# Patient Record
Sex: FEMALE | Race: WHITE
[De-identification: names, ages, dates, MRNs, and addresses within clinical notes are randomized per-mention and may not be internally consistent; named-entity substitution may affect disease eponyms.]

---

## 2017-09-20 ENCOUNTER — HOSPITAL ENCOUNTER (OUTPATIENT)
Dept: HOSPITAL 75 - PREOP | Age: 57
Discharge: HOME | End: 2017-09-20
Attending: PODIATRIST
Payer: COMMERCIAL

## 2017-09-20 ENCOUNTER — HOSPITAL ENCOUNTER (OUTPATIENT)
Dept: HOSPITAL 75 - PREOP | Age: 57
End: 2017-09-20
Attending: INTERNAL MEDICINE
Payer: COMMERCIAL

## 2017-09-20 VITALS — SYSTOLIC BLOOD PRESSURE: 187 MMHG | DIASTOLIC BLOOD PRESSURE: 129 MMHG

## 2017-09-20 VITALS — HEIGHT: 65 IN | WEIGHT: 145 LBS | BODY MASS INDEX: 24.16 KG/M2

## 2017-09-20 PROCEDURE — 93005 ELECTROCARDIOGRAM TRACING: CPT

## 2017-09-20 PROCEDURE — 87081 CULTURE SCREEN ONLY: CPT

## 2017-09-29 ENCOUNTER — HOSPITAL ENCOUNTER (OUTPATIENT)
Dept: HOSPITAL 75 - SDC | Age: 57
Discharge: HOME | End: 2017-09-29
Attending: PODIATRIST
Payer: COMMERCIAL

## 2017-09-29 VITALS — DIASTOLIC BLOOD PRESSURE: 141 MMHG | SYSTOLIC BLOOD PRESSURE: 181 MMHG

## 2017-09-29 VITALS — SYSTOLIC BLOOD PRESSURE: 164 MMHG | DIASTOLIC BLOOD PRESSURE: 125 MMHG

## 2017-09-29 VITALS — SYSTOLIC BLOOD PRESSURE: 170 MMHG | DIASTOLIC BLOOD PRESSURE: 135 MMHG

## 2017-09-29 VITALS — WEIGHT: 145 LBS | BODY MASS INDEX: 24.16 KG/M2 | HEIGHT: 65 IN

## 2017-09-29 VITALS — SYSTOLIC BLOOD PRESSURE: 194 MMHG | DIASTOLIC BLOOD PRESSURE: 138 MMHG

## 2017-09-29 VITALS — DIASTOLIC BLOOD PRESSURE: 138 MMHG | SYSTOLIC BLOOD PRESSURE: 175 MMHG

## 2017-09-29 VITALS — SYSTOLIC BLOOD PRESSURE: 164 MMHG | DIASTOLIC BLOOD PRESSURE: 118 MMHG

## 2017-09-29 DIAGNOSIS — M20.41: ICD-10-CM

## 2017-09-29 DIAGNOSIS — F41.9: ICD-10-CM

## 2017-09-29 DIAGNOSIS — I11.0: ICD-10-CM

## 2017-09-29 DIAGNOSIS — I44.7: ICD-10-CM

## 2017-09-29 DIAGNOSIS — M20.11: Primary | ICD-10-CM

## 2017-09-29 DIAGNOSIS — I50.32: ICD-10-CM

## 2017-09-29 DIAGNOSIS — Z53.09: ICD-10-CM

## 2017-09-29 DIAGNOSIS — M89.371: ICD-10-CM

## 2017-09-29 DIAGNOSIS — Z79.899: ICD-10-CM

## 2017-09-29 DIAGNOSIS — E78.5: ICD-10-CM

## 2018-07-01 ENCOUNTER — HOSPITAL ENCOUNTER (OUTPATIENT)
Dept: HOSPITAL 75 - ER | Age: 58
LOS: 2 days | Discharge: HOME | End: 2018-07-03
Attending: INTERNAL MEDICINE
Payer: COMMERCIAL

## 2018-07-01 VITALS — BODY MASS INDEX: 25.68 KG/M2 | HEIGHT: 65 IN | WEIGHT: 154.13 LBS

## 2018-07-01 DIAGNOSIS — R07.89: Primary | ICD-10-CM

## 2018-07-01 DIAGNOSIS — I42.9: ICD-10-CM

## 2018-07-01 DIAGNOSIS — I11.0: ICD-10-CM

## 2018-07-01 DIAGNOSIS — Z82.49: ICD-10-CM

## 2018-07-01 DIAGNOSIS — F41.9: ICD-10-CM

## 2018-07-01 DIAGNOSIS — E78.5: ICD-10-CM

## 2018-07-01 DIAGNOSIS — Z79.899: ICD-10-CM

## 2018-07-01 DIAGNOSIS — I50.20: ICD-10-CM

## 2018-07-01 LAB
ALBUMIN SERPL-MCNC: 4.7 GM/DL (ref 3.2–4.5)
ALP SERPL-CCNC: 101 U/L (ref 40–136)
ALT SERPL-CCNC: 90 U/L (ref 0–55)
APTT BLD: 27 SEC (ref 24–35)
APTT PPP: YELLOW S
BACTERIA #/AREA URNS HPF: (no result) /HPF
BARBITURATES UR QL: NEGATIVE
BASOPHILS # BLD AUTO: 0 10^3/UL (ref 0–0.1)
BASOPHILS NFR BLD AUTO: 0 % (ref 0–10)
BENZODIAZ UR QL SCN: POSITIVE
BILIRUB SERPL-MCNC: 1 MG/DL (ref 0.1–1)
BILIRUB UR QL STRIP: NEGATIVE
BUN/CREAT SERPL: 15
CALCIUM SERPL-MCNC: 10 MG/DL (ref 8.5–10.1)
CHLORIDE SERPL-SCNC: 109 MMOL/L (ref 98–107)
CO2 SERPL-SCNC: 20 MMOL/L (ref 21–32)
COCAINE UR QL: NEGATIVE
CREAT SERPL-MCNC: 0.87 MG/DL (ref 0.6–1.3)
EOSINOPHIL # BLD AUTO: 0.2 10^3/UL (ref 0–0.3)
EOSINOPHIL NFR BLD AUTO: 1 % (ref 0–10)
ERYTHROCYTE [DISTWIDTH] IN BLOOD BY AUTOMATED COUNT: 12.9 % (ref 10–14.5)
FIBRINOGEN PPP-MCNC: CLEAR MG/DL
GFR SERPLBLD BASED ON 1.73 SQ M-ARVRAT: > 60 ML/MIN
GLUCOSE SERPL-MCNC: 115 MG/DL (ref 70–105)
GLUCOSE UR STRIP-MCNC: NEGATIVE MG/DL
HCT VFR BLD CALC: 44 % (ref 35–52)
HGB BLD-MCNC: 15 G/DL (ref 11.5–16)
INR PPP: 0.9 (ref 0.8–1.4)
KETONES UR QL STRIP: NEGATIVE
LEUKOCYTE ESTERASE UR QL STRIP: (no result)
LIPASE SERPL-CCNC: 52 U/L (ref 8–78)
LYMPHOCYTES # BLD AUTO: 1.3 X 10^3 (ref 1–4)
LYMPHOCYTES NFR BLD AUTO: 10 % (ref 12–44)
MAGNESIUM SERPL-MCNC: 2.5 MG/DL (ref 1.8–2.4)
MANUAL DIFFERENTIAL PERFORMED BLD QL: NO
MCH RBC QN AUTO: 32 PG (ref 25–34)
MCHC RBC AUTO-ENTMCNC: 34 G/DL (ref 32–36)
MCV RBC AUTO: 93 FL (ref 80–99)
METHADONE UR QL SCN: NEGATIVE
METHAMPHETAMINE SCREEN URINE S: NEGATIVE
MONOCYTES # BLD AUTO: 1.2 X 10^3 (ref 0–1)
MONOCYTES NFR BLD AUTO: 9 % (ref 0–12)
MYOGLOBIN SERPL-MCNC: 30.5 NG/ML (ref 10–92)
NEUTROPHILS # BLD AUTO: 11.2 X 10^3 (ref 1.8–7.8)
NEUTROPHILS NFR BLD AUTO: 81 % (ref 42–75)
NITRITE UR QL STRIP: NEGATIVE
OPIATES UR QL SCN: NEGATIVE
OXYCODONE UR QL: NEGATIVE
PH UR STRIP: 7 [PH] (ref 5–9)
PLATELET # BLD: 349 10^3/UL (ref 130–400)
PMV BLD AUTO: 11.4 FL (ref 7.4–10.4)
POTASSIUM SERPL-SCNC: 3.7 MMOL/L (ref 3.6–5)
PROPOXYPH UR QL: NEGATIVE
PROT SERPL-MCNC: 7.3 GM/DL (ref 6.4–8.2)
PROT UR QL STRIP: NEGATIVE
PROTHROMBIN TIME: 12.6 SEC (ref 12.2–14.7)
RBC # BLD AUTO: 4.71 10^6/UL (ref 4.35–5.85)
RBC #/AREA URNS HPF: (no result) /HPF
SODIUM SERPL-SCNC: 143 MMOL/L (ref 135–145)
SP GR UR STRIP: 1.01 (ref 1.02–1.02)
TRICYCLICS UR QL SCN: NEGATIVE
UROBILINOGEN UR-MCNC: NORMAL MG/DL
WBC # BLD AUTO: 13.9 10^3/UL (ref 4.3–11)
WBC #/AREA URNS HPF: (no result) /HPF

## 2018-07-01 PROCEDURE — 85025 COMPLETE CBC W/AUTO DIFF WBC: CPT

## 2018-07-01 PROCEDURE — 85610 PROTHROMBIN TIME: CPT

## 2018-07-01 PROCEDURE — 86757 RICKETTSIA ANTIBODY: CPT

## 2018-07-01 PROCEDURE — 78452 HT MUSCLE IMAGE SPECT MULT: CPT

## 2018-07-01 PROCEDURE — 86666 EHRLICHIA ANTIBODY: CPT

## 2018-07-01 PROCEDURE — 87088 URINE BACTERIA CULTURE: CPT

## 2018-07-01 PROCEDURE — 80306 DRUG TEST PRSMV INSTRMNT: CPT

## 2018-07-01 PROCEDURE — 80061 LIPID PANEL: CPT

## 2018-07-01 PROCEDURE — 83690 ASSAY OF LIPASE: CPT

## 2018-07-01 PROCEDURE — 84484 ASSAY OF TROPONIN QUANT: CPT

## 2018-07-01 PROCEDURE — 86618 LYME DISEASE ANTIBODY: CPT

## 2018-07-01 PROCEDURE — 93041 RHYTHM ECG TRACING: CPT

## 2018-07-01 PROCEDURE — 80048 BASIC METABOLIC PNL TOTAL CA: CPT

## 2018-07-01 PROCEDURE — 85027 COMPLETE CBC AUTOMATED: CPT

## 2018-07-01 PROCEDURE — 36415 COLL VENOUS BLD VENIPUNCTURE: CPT

## 2018-07-01 PROCEDURE — 83874 ASSAY OF MYOGLOBIN: CPT

## 2018-07-01 PROCEDURE — 93005 ELECTROCARDIOGRAM TRACING: CPT

## 2018-07-01 PROCEDURE — 71045 X-RAY EXAM CHEST 1 VIEW: CPT

## 2018-07-01 PROCEDURE — 93458 L HRT ARTERY/VENTRICLE ANGIO: CPT

## 2018-07-01 PROCEDURE — 85379 FIBRIN DEGRADATION QUANT: CPT

## 2018-07-01 PROCEDURE — 83735 ASSAY OF MAGNESIUM: CPT

## 2018-07-01 PROCEDURE — 96360 HYDRATION IV INFUSION INIT: CPT

## 2018-07-01 PROCEDURE — 81000 URINALYSIS NONAUTO W/SCOPE: CPT

## 2018-07-01 PROCEDURE — 80320 DRUG SCREEN QUANTALCOHOLS: CPT

## 2018-07-01 PROCEDURE — 93017 CV STRESS TEST TRACING ONLY: CPT

## 2018-07-01 PROCEDURE — 85730 THROMBOPLASTIN TIME PARTIAL: CPT

## 2018-07-01 PROCEDURE — 84443 ASSAY THYROID STIM HORMONE: CPT

## 2018-07-01 PROCEDURE — 80053 COMPREHEN METABOLIC PANEL: CPT

## 2018-07-01 PROCEDURE — 86668 FRANCISELLA TULARENSIS: CPT

## 2018-07-01 PROCEDURE — 93306 TTE W/DOPPLER COMPLETE: CPT

## 2018-07-01 RX ADMIN — NITROGLYCERIN PRN MG: 0.4 TABLET SUBLINGUAL at 22:41

## 2018-07-01 RX ADMIN — NITROGLYCERIN PRN MG: 0.4 TABLET SUBLINGUAL at 23:26

## 2018-07-01 NOTE — ED CHEST PAIN
General


Chief Complaint:  Chest Pain


Stated Complaint:  CHEST PAIN


Nursing Triage Note:  


PT PRESENTS TO ER WITH COMPLAINT OF CHEST PAIN. STATES SHE HAS HAD IT ALL DAY 


LONG. THINKS IT COULD BE DUE TO INDIGESTION.


Nursing Sepsis Screen:  No Definite Risk


Source:  patient


Exam Limitations:  no limitations





History of Present Illness


Date Seen by Provider:  Jul 1, 2018


Time Seen by Provider:  22:33


Initial Comments


Patient presents to the ER by private conveyance with her significant other and 

a chief complaint that she is having some chest pressure all day long today. 

She has no personal history of coronary artery disease. She does have a history 

of high blood pressure which she said she has not gotten under good control but 

she also admits to not following up with her doctor. She does not know what 

medication she takes for her high blood pressure. She says she's been active 

all day and has not been short of breath had any nausea sweats chills or pain 

radiating to her shoulders or chest. Her pain is not worse with breathing or 

coughing. She does not have a cough. She says her pain is somewhere between 1 

and 10 when asked to rate it. She denies smoking and says her last drink of 

alcohol was yesterday and she drinks about once a week. She denies recreational 

drug use except she did have a THC gummy today. She thought that might help her 

pain but it did not. She also took one of her Xanax for her pain and that did 

not help. She took a couple of aspirin about an hour ago. She has no primary 

family history of early onset coronary artery disease before the age of 50. She 

is not diabetic. She does have a statin prescribed to her that she takes. She 

says the pain is not worse with exertion. She denies any recent surgeries or 

periods of immobility, travel etc. She said her  was also feeling very 

poorly last couple days and she wonders if maybe they both got food poisoning. 

She also states that she was pulled her knees by a dog while walking it 

yesterday and that scuffed up her knees. She says after an exhaustive search on 

we will forward all her symptoms might be she doesn't think she is having a 

heart attack but she is concerned because her chest pressure doesn't go away so 

she decided to come to the ER to have it worked up. She denies a history of 

acid reflux. In the past she has used some shots for weight loss but she is not 

currently using these.





Allergies and Home Medications


Allergies


Coded Allergies:  


     No Known Drug Allergies (Unverified , 9/20/17)





Home Medications


Alprazolam 0.25 Mg Tablet, 0.25 MG PO BID PRN for ANXIETY, (Reported)


Liraglutide 3 Mg/0.5 Ml Pen.injctr, 3 MG SQ DAILY, (Reported)





Patient Home Medication List


Home Medication List Reviewed:  Yes





Review of Systems


Constitutional:  No chills, No diaphoresis


EENTM:  No Blurred Vision, No Double Vision


Respiratory:  Denies Cough, Denies Shortness of Air


Cardiovascular:  Chest Pain (pressure); Denies Edema, Denies Lightheadedness, 

Denies Palpitations, Denies Syncope


Gastrointestinal:  Denies Abdomen Distended, Denies Abdominal Pain, Denies 

Constipated, Denies Diarrhea, Denies Nausea


Genitourinary:  Denies Burning, Denies Discharge


Musculoskeletal:  No back pain, No joint pain


Skin:  No pruritus, No rash


Psychiatric/Neurological:  Denies Headache, Denies Numbness





Past Medical-Social-Family Hx


Patient Social History


Alcohol Use:  Occasionally Uses


Recreational Drug Use:  Yes


Drug of Choice:  MARIJUANA


Smoking Status:  Never a Smoker


Recent Foreign Travel:  No


Contact w/Someone Who Travel:  No


Recent Infectious Disease Expo:  No


Recent Hopitalizations:  No





Immunizations Up To Date


Tetanus Booster (TDap):  Unknown





Seasonal Allergies


Seasonal Allergies:  No





Past Medical History


Surgeries:  No


Respiratory:  No


Cardiac:  Yes


Hypertension


Neurological:  No


Reproductive Disorders:  No


Gastrointestinal:  No


Musculoskeletal:  Yes (Hallux Valgus, Hammertoe 2,3,4,5, Hypertrophic 2nd 

Metatarsal)


Endocrine:  No


HEENT:  No


Loss of Vision:  Denies


Hearing Impairment:  Denies


Cancer:  No


Psychosocial:  Yes


Anxiety


Integumentary:  No


Blood Disorders:  No





Physical Exam


Vital Signs





Vital Signs - First Documented








 7/1/18





 22:28


 


Temp 99.1


 


Pulse 110


 


Resp 25


 


Pulse Ox 97


 


O2 Delivery Room Air





Capillary Refill : Less Than 3 Seconds


General Appearance:  No Apparent Distress, WD/WN, Anxious


HEENT:  PERRL/EOMI, Normal ENT Inspection, Pharynx Normal, Moist Mucous 

Membranes


Neck:  Full Range of Motion, Non Tender, Supple


Respiratory:  Chest Non Tender, Lungs Clear, Normal Breath Sounds, No Accessory 

Muscle Use, No Respiratory Distress


Cardiovascular:  Regular Rate, Rhythm, No Edema, Normal Peripheral Pulses


Gastrointestinal:  Non Tender, Soft


Extremity:  Normal Capillary Refill, Non Tender, No Calf Tenderness, No Pedal 

Edema


Neurologic/Psychiatric:  Alert, Oriented x3, No Motor/Sensory Deficits, Other (

bright affect, pressured speech and leaps of logic. Difficult historian. 

Borderline manic.)


Skin:  Normal Color, Warm/Dry





Progress/Results/Core Measures


Results/Orders


Lab Results





Laboratory Tests








Test


 7/1/18


22:35 7/1/18


23:25 Range/Units


 


 


White Blood Count


 13.9 H


 


 4.3-11.0


10^3/uL


 


Red Blood Count


 4.71 


 


 4.35-5.85


10^6/uL


 


Hemoglobin 15.0   11.5-16.0  G/DL


 


Hematocrit 44   35-52  %


 


Mean Corpuscular Volume 93   80-99  FL


 


Mean Corpuscular Hemoglobin 32   25-34  PG


 


Mean Corpuscular Hemoglobin


Concent 34 


 


 32-36  G/DL





 


Red Cell Distribution Width 12.9   10.0-14.5  %


 


Platelet Count


 349 


 


 130-400


10^3/uL


 


Mean Platelet Volume 11.4 H  7.4-10.4  FL


 


Neutrophils (%) (Auto) 81 H  42-75  %


 


Lymphocytes (%) (Auto) 10 L  12-44  %


 


Monocytes (%) (Auto) 9   0-12  %


 


Eosinophils (%) (Auto) 1   0-10  %


 


Basophils (%) (Auto) 0   0-10  %


 


Neutrophils # (Auto) 11.2 H  1.8-7.8  X 10^3


 


Lymphocytes # (Auto) 1.3   1.0-4.0  X 10^3


 


Monocytes # (Auto) 1.2 H  0.0-1.0  X 10^3


 


Eosinophils # (Auto)


 0.2 


 


 0.0-0.3


10^3/uL


 


Basophils # (Auto)


 0.0 


 


 0.0-0.1


10^3/uL


 


Prothrombin Time 12.6   12.2-14.7  SEC


 


INR Comment 0.9   0.8-1.4  


 


Activated Partial


Thromboplast Time 27 


 


 24-35  SEC





 


D-Dimer


 0.39 


 


 0.00-0.49


UG/ML


 


Sodium Level 143   135-145  MMOL/L


 


Potassium Level 3.7   3.6-5.0  MMOL/L


 


Chloride Level 109 H    MMOL/L


 


Carbon Dioxide Level 20 L  21-32  MMOL/L


 


Anion Gap 14   5-14  MMOL/L


 


Blood Urea Nitrogen 13   7-18  MG/DL


 


Creatinine


 0.87 


 


 0.60-1.30


MG/DL


 


Estimat Glomerular Filtration


Rate > 60 


 


  





 


BUN/Creatinine Ratio 15    


 


Glucose Level 115 H    MG/DL


 


Calcium Level 10.0   8.5-10.1  MG/DL


 


Magnesium Level 2.5 H  1.8-2.4  MG/DL


 


Total Bilirubin 1.0   0.1-1.0  MG/DL


 


Aspartate Amino Transf


(AST/SGOT) 34 


 


 5-34  U/L





 


Alanine Aminotransferase


(ALT/SGPT) 90 H


 


 0-55  U/L





 


Alkaline Phosphatase 101     U/L


 


Myoglobin


 30.5 


 


 10.0-92.0


NG/ML


 


Troponin I < 0.30   <0.30  NG/ML


 


Total Protein 7.3   6.4-8.2  GM/DL


 


Albumin 4.7 H  3.2-4.5  GM/DL


 


Lipase 52   8-78  U/L


 


Serum Alcohol < 10   <10  MG/DL


 


Urine Color  YELLOW   


 


Urine Clarity  CLEAR   


 


Urine pH  7  5-9  


 


Urine Specific Gravity  1.015 L 1.016-1.022  


 


Urine Protein  NEGATIVE  NEGATIVE  


 


Urine Glucose (UA)  NEGATIVE  NEGATIVE  


 


Urine Ketones  NEGATIVE  NEGATIVE  


 


Urine Nitrite  NEGATIVE  NEGATIVE  


 


Urine Bilirubin  NEGATIVE  NEGATIVE  


 


Urine Urobilinogen  NORMAL  NORMAL  MG/DL


 


Urine Leukocyte Esterase  3+ H NEGATIVE  


 


Urine RBC (Auto)  3+ H NEGATIVE  


 


Urine RBC  0-2   /HPF


 


Urine WBC  5-10 H  /HPF


 


Urine Squamous Epithelial


Cells 


 10-25 H


  /HPF





 


Urine Crystals  NONE   /LPF


 


Urine Bacteria  TRACE   /HPF


 


Urine Casts  NONE   /LPF


 


Urine Mucus  SMALL H  /LPF


 


Urine Culture Indicated  YES   


 


Urine Opiates Screen  NEGATIVE  NEGATIVE  


 


Urine Oxycodone Screen  NEGATIVE  NEGATIVE  


 


Urine Methadone Screen  NEGATIVE  NEGATIVE  


 


Urine Propoxyphene Screen  NEGATIVE  NEGATIVE  


 


Urine Barbiturates Screen  NEGATIVE  NEGATIVE  


 


Ur Tricyclic Antidepressants


Screen 


 NEGATIVE 


 NEGATIVE  





 


Urine Phencyclidine Screen  NEGATIVE  NEGATIVE  


 


Urine Amphetamines Screen  NEGATIVE  NEGATIVE  


 


Urine Methamphetamines Screen  NEGATIVE  NEGATIVE  


 


Urine Benzodiazepines Screen  POSITIVE H NEGATIVE  


 


Urine Cocaine Screen  NEGATIVE  NEGATIVE  


 


Urine Cannabinoids Screen  POSITIVE H NEGATIVE  








My Orders





Orders - FRANCISCO ALDRIDGE


Cbc With Automated Diff (7/1/18 22:37)


Magnesium (7/1/18 22:37)


Chest 1 View, Ap/Pa Only (7/1/18 22:37)


Ekg Tracing (7/1/18 22:37)


Cardiac Profile 1 (7/1/18 22:37)


Comprehensive Metabolic Panel (7/1/18 22:37)


Myoglobin Serum (7/1/18 22:37)


Protime With Inr (7/1/18 22:37)


Partial Thromboplastin Time (7/1/18 22:37)


O2 (7/1/18 22:37)


Monitor-Rhythm Ecg Trace Only (7/1/18 22:37)


Lipid Panel (7/2/18 06:00)


Aspirin Chewable Tablet (Baby Aspirin Ch (7/1/18 22:45)


Nitroglycerin 0.4 Mg Btl 25's (Nitrostat (7/1/18 22:45)


Saline Lock/Iv-Start (7/1/18 22:37)


Lipase (7/1/18 22:37)


Fibrin Degradation Products (7/1/18 22:37)


Saline Lock/Iv-Start (7/1/18 22:37)


Lactated Ringers (Lr 1000 Ml Iv Solution (7/1/18 22:37)


Ua Culture If Indicated (7/1/18 22:40)


Nitroglycerin 0.4 Mg Btl 25's (Nitrostat (7/1/18 22:39)


Aspirin Chewable Tablet (Baby Aspirin Ch (7/1/18 22:39)


Alcohol (7/1/18 23:32)


Drug Screen Stat (Urine) (7/1/18 23:25)


Urine Culture (7/1/18 23:25)





Medications Given in ED





Current Medications








 Medications  Dose


 Ordered  Sig/Roni


 Route  Start Time


 Stop Time Status Last Admin


Dose Admin


 


 Aspirin  81 mg  STK-MED ONCE


 .ROUTE  7/1/18 22:39


 7/1/18 22:41 DC 7/1/18 22:48


81 MG


 


 Aspirin  162 mg  ONCE  ONCE


 PO  7/1/18 22:45


 7/1/18 22:46 DC 7/1/18 22:41


162 MG


 


 Lactated Ringer's  1,000 ml @ 


 0 mls/hr  Q0M ONCE


 IV  7/1/18 22:37


 7/1/18 22:41 DC 7/1/18 22:43


1,000 MLS/HR


 


 Nitroglycerin  0.4 mg  UD  PRN


 SL  7/1/18 22:45


    7/1/18 23:26


0.4 MG








Vital Signs/I&O











 7/1/18





 22:28


 


Temp 99.1


 


Pulse 110


 


Resp 25


 


B/P (MAP) 


 


Pulse Ox 97


 


O2 Delivery Room Air











Progress


Progress Note :  


   Time:  22:47


Progress Note


We'll give her a couple more aspirin in addition to the 2 she has already 

taken. We'll try some nitroglycerin. Her blood pressure and heart rate are 

elevated she also has a very bright affect and is very animated. We'll give her 

a liter fluids as this might help. She has already had Xanax from home. Her 

chest pain is not reproducible but either is not very credible for coronary 

disease. If her chest pressures been going on all day than I would expect 

troponin to be elevated by now if there was an NSTEMI. 





ED ACS scores 8 points. Low risk by the EDACS Score.


If the patient also has: (1) EKG without new ischemic changes and (2) negative 

initial and 2-hour troponins, then this patient is safe for discharge to early 

outpatient follow-up investigation (or proceed to earlier inpatient testing). 

If EKG with ischemic changes or positive troponin, they are not low risk and 

require normal risk stratification.





Echo in 2011 showing diastolic dysfunction and an EF of 50%. History of left 

bundle branch block noted in September 2017.


Initial ECG Impression Date:  Jul 1, 2018


Initial ECG Impression Time:  22:32


Initial ECG Rate:  108


Initial ECG Rhythm:  S.Tach


Initial ECG Intervals:  QT (472)


Initial ECG Impression:  Nonspecific Changes


Initial ECG Comparisson:  Unchanged


Comment


Stable left bundle branch block and sinus tachycardia.





Diagnostic Imaging





   Diagonstic Imaging:  Xray


   Plain Films/CT/US/NM/MRI:  chest (1v)


Comments


No acute cardiopulmonary processes noted.


   Reviewed:  Reviewed by Me


Consults :  


   Consulting Physician:  A





Departure


Communication (Admissions)


Time/Spoke to Admitting Phy:  00:30


Dr Blanco; discussed case lab EKG imaging and he agrees to see the patient.


Time/Spoke to Consulting Phy:  00:00


Discussed case lab EKG and imaging with Dr. Wilhelm and he recommends an 

overnight stay and he would plan to do a stress test in the morning.





Impression





 Primary Impression:  


 Chest pain


 Qualified Codes:  R07.2 - Precordial pain


Disposition:  09 ADMITTED AS INPATIENT


Condition:  Improved





Admissions


Decision to Admit Reason:  Admit from ER (General)


Decision to Admit/Date:  Jul 2, 2018


Time/Decision to Admit Time:  00:07





Departure-Patient Inst.


Referrals:  


JOSEF JACKSON DO (PCP/Family)


Primary Care Physician





Copy


Copies To 1:   SANNA WILHELM MD; JOSEF JACKSON TITUS J Jul 1, 2018 22:45

## 2018-07-02 VITALS — DIASTOLIC BLOOD PRESSURE: 101 MMHG | SYSTOLIC BLOOD PRESSURE: 146 MMHG

## 2018-07-02 VITALS — SYSTOLIC BLOOD PRESSURE: 145 MMHG | DIASTOLIC BLOOD PRESSURE: 96 MMHG

## 2018-07-02 VITALS — DIASTOLIC BLOOD PRESSURE: 95 MMHG | SYSTOLIC BLOOD PRESSURE: 140 MMHG

## 2018-07-02 VITALS — DIASTOLIC BLOOD PRESSURE: 103 MMHG | SYSTOLIC BLOOD PRESSURE: 144 MMHG

## 2018-07-02 VITALS — SYSTOLIC BLOOD PRESSURE: 143 MMHG | DIASTOLIC BLOOD PRESSURE: 99 MMHG

## 2018-07-02 VITALS — SYSTOLIC BLOOD PRESSURE: 130 MMHG | DIASTOLIC BLOOD PRESSURE: 95 MMHG

## 2018-07-02 VITALS — DIASTOLIC BLOOD PRESSURE: 127 MMHG | SYSTOLIC BLOOD PRESSURE: 175 MMHG

## 2018-07-02 VITALS — SYSTOLIC BLOOD PRESSURE: 141 MMHG | DIASTOLIC BLOOD PRESSURE: 94 MMHG

## 2018-07-02 VITALS — DIASTOLIC BLOOD PRESSURE: 101 MMHG | SYSTOLIC BLOOD PRESSURE: 155 MMHG

## 2018-07-02 VITALS — SYSTOLIC BLOOD PRESSURE: 158 MMHG | DIASTOLIC BLOOD PRESSURE: 106 MMHG

## 2018-07-02 VITALS — DIASTOLIC BLOOD PRESSURE: 91 MMHG | SYSTOLIC BLOOD PRESSURE: 147 MMHG

## 2018-07-02 VITALS — DIASTOLIC BLOOD PRESSURE: 96 MMHG | SYSTOLIC BLOOD PRESSURE: 138 MMHG

## 2018-07-02 VITALS — DIASTOLIC BLOOD PRESSURE: 105 MMHG | SYSTOLIC BLOOD PRESSURE: 151 MMHG

## 2018-07-02 VITALS — SYSTOLIC BLOOD PRESSURE: 133 MMHG | DIASTOLIC BLOOD PRESSURE: 94 MMHG

## 2018-07-02 VITALS — DIASTOLIC BLOOD PRESSURE: 111 MMHG | SYSTOLIC BLOOD PRESSURE: 158 MMHG

## 2018-07-02 VITALS — SYSTOLIC BLOOD PRESSURE: 151 MMHG | DIASTOLIC BLOOD PRESSURE: 97 MMHG

## 2018-07-02 VITALS — SYSTOLIC BLOOD PRESSURE: 120 MMHG | DIASTOLIC BLOOD PRESSURE: 87 MMHG

## 2018-07-02 VITALS — SYSTOLIC BLOOD PRESSURE: 142 MMHG | DIASTOLIC BLOOD PRESSURE: 99 MMHG

## 2018-07-02 VITALS — SYSTOLIC BLOOD PRESSURE: 147 MMHG | DIASTOLIC BLOOD PRESSURE: 102 MMHG

## 2018-07-02 LAB
BASOPHILS # BLD AUTO: 0 10^3/UL (ref 0–0.1)
BASOPHILS NFR BLD AUTO: 0 % (ref 0–10)
BUN/CREAT SERPL: 13
CALCIUM SERPL-MCNC: 9 MG/DL (ref 8.5–10.1)
CHLORIDE SERPL-SCNC: 110 MMOL/L (ref 98–107)
CHOLEST SERPL-MCNC: 110 MG/DL (ref ?–200)
CO2 SERPL-SCNC: 20 MMOL/L (ref 21–32)
CREAT SERPL-MCNC: 0.72 MG/DL (ref 0.6–1.3)
EOSINOPHIL # BLD AUTO: 0.1 10^3/UL (ref 0–0.3)
EOSINOPHIL NFR BLD AUTO: 1 % (ref 0–10)
ERYTHROCYTE [DISTWIDTH] IN BLOOD BY AUTOMATED COUNT: 12.7 % (ref 10–14.5)
GFR SERPLBLD BASED ON 1.73 SQ M-ARVRAT: > 60 ML/MIN
GLUCOSE SERPL-MCNC: 96 MG/DL (ref 70–105)
HCT VFR BLD CALC: 38 % (ref 35–52)
HDLC SERPL-MCNC: 49 MG/DL (ref 40–60)
HGB BLD-MCNC: 13.3 G/DL (ref 11.5–16)
LYMPHOCYTES # BLD AUTO: 1.4 X 10^3 (ref 1–4)
LYMPHOCYTES NFR BLD AUTO: 16 % (ref 12–44)
MANUAL DIFFERENTIAL PERFORMED BLD QL: NO
MCH RBC QN AUTO: 33 PG (ref 25–34)
MCHC RBC AUTO-ENTMCNC: 35 G/DL (ref 32–36)
MCV RBC AUTO: 94 FL (ref 80–99)
MONOCYTES # BLD AUTO: 1 X 10^3 (ref 0–1)
MONOCYTES NFR BLD AUTO: 11 % (ref 0–12)
NEUTROPHILS # BLD AUTO: 6.7 X 10^3 (ref 1.8–7.8)
NEUTROPHILS NFR BLD AUTO: 72 % (ref 42–75)
PLATELET # BLD: 280 10^3/UL (ref 130–400)
PMV BLD AUTO: 11.2 FL (ref 7.4–10.4)
POTASSIUM SERPL-SCNC: 3.6 MMOL/L (ref 3.6–5)
RBC # BLD AUTO: 4.07 10^6/UL (ref 4.35–5.85)
SODIUM SERPL-SCNC: 141 MMOL/L (ref 135–145)
TRIGL SERPL-MCNC: 51 MG/DL (ref ?–150)
VLDLC SERPL CALC-MCNC: 10 MG/DL (ref 5–40)
WBC # BLD AUTO: 9.3 10^3/UL (ref 4.3–11)

## 2018-07-02 RX ADMIN — ASPIRIN SCH MG: 81 TABLET ORAL at 09:02

## 2018-07-02 RX ADMIN — LOSARTAN POTASSIUM SCH MG: 50 TABLET, FILM COATED ORAL at 20:15

## 2018-07-02 RX ADMIN — SODIUM CHLORIDE SCH MLS/HR: 900 INJECTION, SOLUTION INTRAVENOUS at 17:58

## 2018-07-02 RX ADMIN — SODIUM CHLORIDE AND POTASSIUM CHLORIDE SCH MLS/HR: 4.5; 1.49 INJECTION, SOLUTION INTRAVENOUS at 01:43

## 2018-07-02 RX ADMIN — CARVEDILOL SCH MG: 3.12 TABLET, FILM COATED ORAL at 20:16

## 2018-07-02 RX ADMIN — SODIUM CHLORIDE AND POTASSIUM CHLORIDE SCH MLS/HR: 4.5; 1.49 INJECTION, SOLUTION INTRAVENOUS at 15:16

## 2018-07-02 RX ADMIN — SODIUM CHLORIDE AND POTASSIUM CHLORIDE SCH MLS/HR: 4.5; 1.49 INJECTION, SOLUTION INTRAVENOUS at 07:56

## 2018-07-02 RX ADMIN — SODIUM CHLORIDE AND POTASSIUM CHLORIDE SCH MLS/HR: 4.5; 1.49 INJECTION, SOLUTION INTRAVENOUS at 21:09

## 2018-07-02 NOTE — CARDIAC CATH REPORT
Cardiac Cath Report


Physician (s)/Assistant (s)


Physician


SANNA HICKMAN MD





Pre-Procedure Diagnosis


Pre-Procedure Diagnosis:  Congestive heart failure





Post-Procedure Note


Procedure Start Date:  Jul 2, 2018


Name of Procedure:  


58 years old lady with strong family history of heart disease admitted


with chest pain and EKG changes, stress test did not show significant


ischemia except for mild reversible ischemia involving the apical


anterolateral and inferolateral segment of the left ventricle, her


ejection fraction was 32 percent, echocardiogram estimated ejection


fraction 35-40 percent.  Decided to proceed with cardiac catheterization


Procedure name: Left heart catheterization, left ventriculogram, aortic


root angiogram


Findings/Procedure Note


PROCEDURE NOTE:


After explaining the procedure to the patient, all pros and cons were explained

, all questions were answered.  The patient signed the consent and then she  

was placed on the cardiac catheterization laboratory. Groin was prepped SL 

fashion local anesthesia was used. Sheath placed in the right femoral artery. 

[place] right and left catheter were used to access the coronary system. 

Pigtail was used to access the left ventricular cavity. 


Left ventriculogram was done


Aortic root angiogram was done


At the end of the procedure the sheath was removed. Closure device was used





FINDINGS:





Hemodynamics 


/19, end-diastolic pressure of 19


Aorta 145/96 mean of 103





ANATOMY:


Left Main is free of obstructive disease


Left Anterior Descending is free of obstructive disease


Left Circumflex is free of obstructive disease


Right Coronory Artery has mild disease at the midportion nonobstructive disease


LV Gram is dilated with diffuse left ventricular hypokinesia estimated ejection 

fraction 30-35 percent


Aorta evaluation that with aortic root angiogram which showed slightly 

prominent ascending aorta, no dissection





CONCLUSION:


1.  Severe nonischemic cardiomyopathy with left ventricular systolic dysfunction

, ejection fraction 30-35 percent


2.  Slightly prominent ascending aorta with hypertensive changes





DISCUSSION AND RECOMMENDATION:


Patient will be started on Coreg and losartan, advanced dose as tolerated


Anesthesia Type:  Conscious Sedation


Estimated blood loss (mL):  10 ml


Contrast Amount:  62 ml


Total Radiation Dose:  186 mGy





Post-Procedure Diagnosis


Post-operative diagnosis:  


Congestive heart failure, nonischemic cardiomyopathy


Hypertension


Hyperlipidemia


Family history of heart disease











SANNA HICKMAN MD Jul 2, 2018 18:19

## 2018-07-02 NOTE — CARDIOLOGY PROGRESS NOTE
Subjective


Date Seen by Provider:  Jul 2, 2018


Time Seen by Provider:  17:29


Subjective/Events-last exam


Patient is laying down in bed, denied any chest pain, anxious to go home, had a 

long discussion with her and her  regarding the finding on her stress 

test and echocardiogram are recommended cardiac catheterization due to the 

severe cardiomyopathy and slightly abnormal stress test in addition to the 

active chest pain that she had yesterday.


Review of Systems


General:  No Chills, No Night Sweats, No Fatigue, No Malaise, No Appetite, No 

Other


HEENT:  No Head Aches, No Visual Changes, No Eye Pain, No Ear Pain, No Dysphasia

, No Sinus Congestion, No Post Nasal Drip, No Sore Throat, No Other


Pulmonary:  No Dyspnea, No Cough, No Pleuritic Chest Pain, No Other


Cardiovascular:  No: Chest Pain, Palpitations, Orthopnea, Paroxysmal Noc. 

Dyspnea, Edema, Lt Headedness, Other





Objective-Cardiology


Exam


Last Set of Vital Signs





Vital Signs








 7/2/18





 16:00


 


Temp 97.7


 


Pulse 88


 


Resp 20


 


B/P (MAP) 147/91 (109)


 


Pulse Ox 94


 


O2 Delivery Room Air





Capillary Refill : Less Than 3 Seconds


I&O











Intake and Output 


 


 7/2/18





 00:00


 


Intake Total 1000 ml


 


Balance 1000 ml


 


 


 


IV Total 1000 ml








General:  Alert, Oriented X3, Cooperative


HEENT:  Atraumatic, PERRLA


Neck:  Supple, No JVD, No Thyromegaly


Lungs:  Clear to Auscultation, Normal Air Movement


Heart:  Regular Rate, Normal S1, Normal S2, No Murmurs


Abdomen:  Normal Bowel Sounds, Soft, No Tenderness, No Hepatosplenomegaly, No 

Masses


Extremities:  No Clubbing, No Cyanosis, No Edema, Normal Pulses, No Tenderness/

Swelling


Skin:  No Rashes, No Breakdown, No Significant Lesion


Neuro:  Normal Gait, Normal Speech, Strength at 5/5 X4 Ext, Normal Tone, 

Sensation Intact


Psych/Mental Status:  Mental Status NL, Mood NL





Results


Lab


Laboratory Tests


7/1/18 22:35








7/2/18 05:10














A/P-Cardiology


Admission Diagnosis


Chest pain nonspecific etiology


Hypertension


Hyperlipidemia


Abnormal EKG





Assessment/Plan


Chest pain nonspecific etiology, resembling angina, responsive to sublingual 

nitroglycerin, cardiac enzymes are negative, stress test did not show 

significant ischemia, there was mild decrease uptake at the apical segment of 

the anterolateral and inferolateral wall, ejection fraction was 32 percent on 

the SPECT images, on echocardiogram it was 35-40 percent, patient did not have 

history of heart failure, we had a long discussion about management plan, 

recommended cardiac catheterization in addition to initiating aggressive heart 

failure medications





Hypertension, I will discontinue Norvasc and start on Coreg and losartan and 

evaluate tolerance





Hyperlipidemia, restart Lipitor





Abnormal EKG with left ventricular hypertrophy pattern, incomplete bundle 

branch block





Strong family history of heart disease, father had myocardial infarction at age 

50





Clinical Quality Measures


DVT/VTE Risk/Contraindication:


RFS Level Per Nursing on Admit:  0=No Risk/No VTE PPX











SANNA HICKMAN MD Jul 2, 2018 17:31

## 2018-07-02 NOTE — CONSULTATION-CARDIOLOGY
HPI-Cardiology


Cardiology Consultation


Date of Consultation


7/2/18


Date of Admission





Time Seen by Provider:  08:01


Indication:  Chest pain





HPI


58 years old lady with history of hypertension, history of chest pain in the 

remote past, had a stress test done in 2011.  Was in her usual state of health 

until yesterday when she started having persistent chest pain lasted the whole 

day described it as dull in nature in the left side of her chest radiating to 

the side, no shortness of breath, no palpitation, no syncope or near syncopal 

episodes.  The pain persisted the whole day until she came to the emergency 

room and relieved with sublingual nitroglycerin.  Currently she is chest pain-

free.  She denied any palpitation, syncope or near syncopal episode, reporting 

difficulties achieving adequate blood pressure control.





Home Medications & Allergies


Allergies:  


Coded Allergies:  


     No Known Drug Allergies (Unverified , 9/20/17)


Home Medication List Reviewed:  Yes





PMH-Social-Family Hx


Patient Social History


Marital Status:  


Employed/Student:  employed


Alcohol Use:  Occasionally Uses


Recreational Drug Use:  Yes (MARIJUANA)


Drug of Choice:  MARIJUANA


Smoking Status:  Never a Smoker


Recent Foreign Travel:  No


Recent Infectious Disease Expo:  No


Recent Hopitalizations:  No


Physical Abuse Screen:  No


Sexual Abuse:  No





Immunizations Up To Date


Tetanus Booster (TDap):  Unknown





Past Medical History


Past medical history as discussed below





Family Medical History


Family History:  


Cardiovascular disease


  19 FATHER


  19 MOTHER





Constitutional:  no symptoms reported, see HPI


EENTM:  see HPI, no symptoms reported


Respiratory:  see HPI; No cough, No dyspnea on exertion, No hemoptysis, No 

orthopnea, No phlegm, No short of breath, No stridor, No wheezing, No other


Cardiovascular:  see HPI, chest pain; No edema, No Hx of Intervention, No 

palpitations, No syncope, No vascular heart diseas, No other


Gastrointestinal:  no symptoms reported, see HPI


Genitourinary:  no symptoms reported, see HPI


Musculoskeletal:  no symptoms reported, see HPI


Skin:  no symptoms reported, see HPI


Psychiatric/Neurological:  No Symptoms Reported, See HPI





Reviewed Test Results


Reviewed Test Results


Lab





Laboratory Tests








Test


 7/1/18


22:35 7/1/18


23:25 7/2/18


05:10 Range/Units


 


 


White Blood Count


 13.9 H


 


 9.3 


 4.3-11.0


10^3/uL


 


Red Blood Count


 4.71 


 


 4.07 L


 4.35-5.85


10^6/uL


 


Hemoglobin 15.0   13.3  11.5-16.0  G/DL


 


Hematocrit 44   38  35-52  %


 


Mean Corpuscular Volume 93   94  80-99  FL


 


Mean Corpuscular Hemoglobin 32   33  25-34  PG


 


Mean Corpuscular Hemoglobin


Concent 34 


 


 35 


 32-36  G/DL





 


Red Cell Distribution Width 12.9   12.7  10.0-14.5  %


 


Platelet Count


 349 


 


 280 


 130-400


10^3/uL


 


Mean Platelet Volume 11.4 H  11.2 H 7.4-10.4  FL


 


Neutrophils (%) (Auto) 81 H  72  42-75  %


 


Lymphocytes (%) (Auto) 10 L  16  12-44  %


 


Monocytes (%) (Auto) 9   11  0-12  %


 


Eosinophils (%) (Auto) 1   1  0-10  %


 


Basophils (%) (Auto) 0   0  0-10  %


 


Neutrophils # (Auto) 11.2 H  6.7  1.8-7.8  X 10^3


 


Lymphocytes # (Auto) 1.3   1.4  1.0-4.0  X 10^3


 


Monocytes # (Auto) 1.2 H  1.0  0.0-1.0  X 10^3


 


Eosinophils # (Auto)


 0.2 


 


 0.1 


 0.0-0.3


10^3/uL


 


Basophils # (Auto)


 0.0 


 


 0.0 


 0.0-0.1


10^3/uL


 


Prothrombin Time 12.6    12.2-14.7  SEC


 


INR Comment 0.9    0.8-1.4  


 


Activated Partial


Thromboplast Time 27 


 


 


 24-35  SEC





 


D-Dimer


 0.39 


 


 


 0.00-0.49


UG/ML


 


Sodium Level 143   141  135-145  MMOL/L


 


Potassium Level 3.7   3.6  3.6-5.0  MMOL/L


 


Chloride Level 109 H  110 H   MMOL/L


 


Carbon Dioxide Level 20 L  20 L 21-32  MMOL/L


 


Anion Gap 14   11  5-14  MMOL/L


 


Blood Urea Nitrogen 13   9  7-18  MG/DL


 


Creatinine


 0.87 


 


 0.72 


 0.60-1.30


MG/DL


 


Estimat Glomerular Filtration


Rate > 60 


 


 > 60 


  





 


BUN/Creatinine Ratio 15   13   


 


Glucose Level 115 H  96    MG/DL


 


Calcium Level 10.0   9.0  8.5-10.1  MG/DL


 


Magnesium Level 2.5 H   1.8-2.4  MG/DL


 


Total Bilirubin 1.0    0.1-1.0  MG/DL


 


Aspartate Amino Transf


(AST/SGOT) 34 


 


 


 5-34  U/L





 


Alanine Aminotransferase


(ALT/SGPT) 90 H


 


 


 0-55  U/L





 


Alkaline Phosphatase 101      U/L


 


Myoglobin


 30.5 


 


 


 10.0-92.0


NG/ML


 


Troponin I < 0.30   < 0.30  <0.30  NG/ML


 


Total Protein 7.3    6.4-8.2  GM/DL


 


Albumin 4.7 H   3.2-4.5  GM/DL


 


Lipase 52    8-78  U/L


 


Serum Alcohol < 10    <10  MG/DL


 


Urine Color  YELLOW    


 


Urine Clarity  CLEAR    


 


Urine pH  7   5-9  


 


Urine Specific Gravity  1.015 L  1.016-1.022  


 


Urine Protein  NEGATIVE   NEGATIVE  


 


Urine Glucose (UA)  NEGATIVE   NEGATIVE  


 


Urine Ketones  NEGATIVE   NEGATIVE  


 


Urine Nitrite  NEGATIVE   NEGATIVE  


 


Urine Bilirubin  NEGATIVE   NEGATIVE  


 


Urine Urobilinogen  NORMAL   NORMAL  MG/DL


 


Urine Leukocyte Esterase  3+ H  NEGATIVE  


 


Urine RBC (Auto)  3+ H  NEGATIVE  


 


Urine RBC  0-2    /HPF


 


Urine WBC  5-10 H   /HPF


 


Urine Squamous Epithelial


Cells 


 10-25 H


 


  /HPF





 


Urine Crystals  NONE    /LPF


 


Urine Bacteria  TRACE    /HPF


 


Urine Casts  NONE    /LPF


 


Urine Mucus  SMALL H   /LPF


 


Urine Culture Indicated  YES    


 


Urine Opiates Screen  NEGATIVE   NEGATIVE  


 


Urine Oxycodone Screen  NEGATIVE   NEGATIVE  


 


Urine Methadone Screen  NEGATIVE   NEGATIVE  


 


Urine Propoxyphene Screen  NEGATIVE   NEGATIVE  


 


Urine Barbiturates Screen  NEGATIVE   NEGATIVE  


 


Ur Tricyclic Antidepressants


Screen 


 NEGATIVE 


 


 NEGATIVE  





 


Urine Phencyclidine Screen  NEGATIVE   NEGATIVE  


 


Urine Amphetamines Screen  NEGATIVE   NEGATIVE  


 


Urine Methamphetamines Screen  NEGATIVE   NEGATIVE  


 


Urine Benzodiazepines Screen  POSITIVE H  NEGATIVE  


 


Urine Cocaine Screen  NEGATIVE   NEGATIVE  


 


Urine Cannabinoids Screen  POSITIVE H  NEGATIVE  


 


Triglycerides Level   51  <150  MG/DL


 


Cholesterol Level   110  < 200  MG/DL


 


LDL Cholesterol Direct   46  1-129  MG/DL


 


VLDL Cholesterol   10  5-40  MG/DL


 


HDL Cholesterol   49  40-60  MG/DL











Physical Exam


Vital Signs





Vital Signs - First Documented








 7/1/18 7/2/18





 22:28 00:51


 


Temp 99.1 


 


Pulse 110 


 


Resp 25 


 


B/P (MAP)  145/98


 


Pulse Ox 97 


 


O2 Delivery Room Air 





Capillary Refill : Less Than 3 Seconds


General Appearance:  No Apparent Distress, WD/WN


Eyes:  Bilateral Eye Normal Inspection, Bilateral Eye PERRL, Bilateral Eye EOMI


HEENT:  PERRL/EOMI, TMs Normal, Normal ENT Inspection, Pharynx Normal


Neck:  Full Range of Motion, Normal Inspection, Non Tender, Supple, Carotid 

Bruit


Respiratory:  Chest Non Tender, Lungs Clear, Normal Breath Sounds, No Accessory 

Muscle Use, No Respiratory Distress


Cardiovascular:  Regular Rate, Rhythm, No Edema, No Gallop, No JVD, No Murmur, 

Normal Peripheral Pulses


Gastrointestinal:  Normal Bowel Sounds, No Organomegaly, No Pulsatile Mass, Non 

Tender, Soft


Back:  Normal Inspection, No CVA Tenderness, No Vertebral Tenderness


Extremity:  Normal Capillary Refill, Normal Inspection, Normal Range of Motion, 

Non Tender, No Calf Tenderness, No Pedal Edema


Neurologic/Psychiatric:  Alert, Oriented x3, No Motor/Sensory Deficits, Normal 

Mood/Affect


Skin:  Normal Color, Warm/Dry


Lymphatic:  No Adenopathy





A/P-Cardiology


Admission Diagnosis


Chest pain nonspecific etiology


Hypertension


Hyperlipidemia


Abnormal EKG





Assessment/Plan


Chest pain nonspecific etiology, resembling angina, responsive to sublingual 

nitroglycerin, cardiac enzymes are negative, currently chest pain-free, had 

left ventricular hypertrophy pattern and hypertensive changes on her EKG.  

Planning to proceed with stress test





Hypertension, I will restart her home medications and monitor blood pressure





Hyperlipidemia, restart Lipitor





Abnormal EKG with left ventricular hypertrophy pattern, incomplete bundle 

branch block





Clinical Quality Measures


DVT/VTE Risk/Contraindication:


RFS Level Per Nursing on Admit:  0=No Risk/No VTE PPX











SANNA HICKMAN MD Jul 2, 2018 08:05

## 2018-07-02 NOTE — CARDIAC PROCEDURE NOTE-CS/ASA
Pre-Procedure Note


Pre-Op Procedure Note


H&P Reviewed


The H&P was reviewed, patient examined and no changes noted.


Date H&P Reviewed:  Jul 2, 2018


Time H&P Reviewed:  17:31





Conscious Sedation Pre-Proced


Time Reviewed:  17:31


ASA Class:  3











Airway Mallampati Classification: (Elim IRA appropriate class) I.  II.  III,  IV


 


Lungs 


 


Heart 


 


 ASA score


 


 ASA 1: a normal healthy patient


 


 ASA 2:  a patient with a mild systemic disease (mid diabetes, controlled 

hypertension, obesity 


 


x ASA 3:  a patient with a severe systemic disease that limits activity  (angina

, COPD, prior Myocardial infarction)


 


 ASA 4:  a patient with an incapacitating disease that is a constant threat to 

life (CHF, renal failure)


 


 ASA 5:  a moribund patient not expected to survive 24 hrs.  (ruptured aneurysm)


 


 ASA 6:  a declared brain dead patient whose organs are being harvested.


 


 For emergent operations, add the letter E after the classification








Grade 3


Sedation Plan:  Analgesia, Amnesia, Plan communicated to team members, 

Discussed options with patient/fam, Discussed risks with patient/fam


Note


The patient is an appropriate candidate to undergo the planned procedure, 

sedation, and anesthesia.





The patient immediately re-assessed prior to indication.











SANNA HICKMAN MD Jul 2, 2018 17:31

## 2018-07-02 NOTE — DIAGNOSTIC IMAGING REPORT
INDICATION: Chest pain.



EXAMINATION: Chest, 7/1/2018.



FINDINGS:



Heart is prominent. Pulmonary vasculature unremarkable. Right

hemidiaphragm somewhat elevated. There are no infiltrates or

effusions. No pneumothorax.



IMPRESSION:

1. Cardiomegaly. Otherwise no acute abnormality appreciated.



Dictated by: 



  Dictated on workstation # [id number]

## 2018-07-02 NOTE — STRESS TEST
DATE OF SERVICE:  07/02/2018



LEXISCAN MYOVIEW STRESS TEST REPORT



INDICATION:

Chest pain.



Baseline heart rate is 81.  Baseline blood pressure 175/120.  Baseline EKG is

sinus rhythm with no ischemic changes.



In summary, the patient was injected with 10.37 mCi of technetium-99 Myoview and

the resting images were obtained, then received 0.4 mg of Lexiscan followed by

30.3 mCi of technetium-99 Myoview.  Throughout the test, there were no EKG

changes.



The resting and stress images were reviewed and compared in the short axis,

horizontal long axis, and vertical long axis views.  Review of the images showed

good radiotracer uptake with breast attenuation affecting the quality of the

images.  There is mild decreased uptake at the apical segment of the

anterolateral and inferolateral left ventricle with subtle reversibility.  SSS

is 2, SDS 2, TID value of 0.98.  On the gated images, the left ventricle is

normal in size with diffuse left ventricular hypokinesia, calculated ejection

fraction 32%.



CONCLUSION:

1.  The patient tolerated Lexiscan well.

2.  Breast attenuation with subtle abnormality.  No significant ischemia or

infarction was noted.

3.  Prominent left ventricle with diffuse left ventricular hypokinesia,

calculated ejection fraction 32%.





Job ID: [id number]

DocumentID: [id number]

Dictated Date:  07/02/2018 16:10:39

Transcription Date: 07/02/2018 19:38:26

Dictated By: SANNA HICKMAN MD

## 2018-07-03 VITALS — DIASTOLIC BLOOD PRESSURE: 96 MMHG | SYSTOLIC BLOOD PRESSURE: 132 MMHG

## 2018-07-03 VITALS — SYSTOLIC BLOOD PRESSURE: 123 MMHG | DIASTOLIC BLOOD PRESSURE: 95 MMHG

## 2018-07-03 VITALS — DIASTOLIC BLOOD PRESSURE: 81 MMHG | SYSTOLIC BLOOD PRESSURE: 110 MMHG

## 2018-07-03 LAB
BUN/CREAT SERPL: 10
CALCIUM SERPL-MCNC: 9.2 MG/DL (ref 8.5–10.1)
CHLORIDE SERPL-SCNC: 111 MMOL/L (ref 98–107)
CO2 SERPL-SCNC: 18 MMOL/L (ref 21–32)
CREAT SERPL-MCNC: 0.84 MG/DL (ref 0.6–1.3)
ERYTHROCYTE [DISTWIDTH] IN BLOOD BY AUTOMATED COUNT: 13.1 % (ref 10–14.5)
GFR SERPLBLD BASED ON 1.73 SQ M-ARVRAT: > 60 ML/MIN
GLUCOSE SERPL-MCNC: 154 MG/DL (ref 70–105)
HCT VFR BLD CALC: 41 % (ref 35–52)
HGB BLD-MCNC: 14 G/DL (ref 11.5–16)
MCH RBC QN AUTO: 32 PG (ref 25–34)
MCHC RBC AUTO-ENTMCNC: 34 G/DL (ref 32–36)
MCV RBC AUTO: 95 FL (ref 80–99)
PLATELET # BLD: 292 10^3/UL (ref 130–400)
PMV BLD AUTO: 10.9 FL (ref 7.4–10.4)
POTASSIUM SERPL-SCNC: 4.1 MMOL/L (ref 3.6–5)
RBC # BLD AUTO: 4.33 10^6/UL (ref 4.35–5.85)
SODIUM SERPL-SCNC: 140 MMOL/L (ref 135–145)
WBC # BLD AUTO: 8.1 10^3/UL (ref 4.3–11)

## 2018-07-03 RX ADMIN — SODIUM CHLORIDE AND POTASSIUM CHLORIDE SCH MLS/HR: 4.5; 1.49 INJECTION, SOLUTION INTRAVENOUS at 08:17

## 2018-07-03 RX ADMIN — ASPIRIN SCH MG: 81 TABLET ORAL at 08:16

## 2018-07-03 RX ADMIN — SODIUM CHLORIDE SCH MLS/HR: 900 INJECTION, SOLUTION INTRAVENOUS at 00:57

## 2018-07-03 RX ADMIN — SODIUM CHLORIDE AND POTASSIUM CHLORIDE SCH MLS/HR: 4.5; 1.49 INJECTION, SOLUTION INTRAVENOUS at 04:23

## 2018-07-03 RX ADMIN — CARVEDILOL SCH MG: 3.12 TABLET, FILM COATED ORAL at 08:16

## 2018-07-03 RX ADMIN — LOSARTAN POTASSIUM SCH MG: 50 TABLET, FILM COATED ORAL at 08:16

## 2018-07-03 NOTE — DISCHARGE INST-POST CATH
Discharge Inst-CATH


Post Cardiac Cath D/C Inst


Follow Up/Plan


Appointment with Dr. Wilhelm's office in 4 weeks


CARDIAC CATH DISCHARGE INSTRUCTIONS





*Hold Metformin for 48 hours post heart cath.





ACTIVITY





* Go Home directly and rest.


* Limit activity of the leg (or wrist if it was used) for 7 days including 

aerobics, swimming,


   jogging, bicycling, etc.


* Restrict stair-climbing for 7 days if possible, if not, climb up with your non

-cath leg, then


   bring together on the same step.


* Avoid lifting, pushing, pulling or excessive movement of the affected 

extremity for 7 days.


* Customary sexual activity may be resumed after 2 days-use caution not to use 

a position  


   that strains or causes pain to the affected extremity.


* No driving for 24 hours.


* NO SMOKING. 


* Avoid straining for bowel movements for 7 days.


* Gentle walking on level ground is allowed.


* Returning to work will depend on the type of procedure and the results. Your 

doctor will discuss


   this with you.





CALL YOUR DOCTOR FOR ANY OF THE FOLLOWING:





*If bleeding from the puncture site occurs- Apply gentle pressure to site with 

clean cloth and call


   your doctor or EMS.


* If a knot or lump forms under the skin, increases in size, or causes pain.


* If bruising appears to be worsening or moving further down your leg instead 

of disappearing.


* Temperature above 101 F.





CARE OF YOUR GROIN INCISION;





* Bruising or purple discoloration of the skin near the puncture site is common.


* You may shower only, no bathtub bathing for 5 days.  Be careful to avoid 

slipping as your


   leg may feel stiff.


* If a closure device was used on your femoral artery, please see the attached 

guide regarding


   care of the device and your leg.


* REMOVE the dressing from your groin the next day after your procedure in the 

shower.





CARE OF YOUR WRIST INCISION;





* Bruising or purple discoloration of the skin near the puncture site is common.


* You may shower.


* DO NOT submerge wrist.


* Remove dressing in 24 hours.











SANNA WILHELM MD Jul 3, 2018 09:56

## 2018-07-03 NOTE — CARDIOLOGY DISCHARGE SUMMARY
Diagnosis/Chief Complaint


Date of Admission


Jul 2, 2018 at 00:30


Date of Discharge


July 3, 2018


Admission Diagnosis


Chest pain nonspecific etiology


Hypertension


Hyperlipidemia


Abnormal EKG





Discharge Diagnosis





Chest pain nonspecific etiology


Congestive heart failure, acute left ventricular systolic dysfunction,


nonischemic cardiomyopathy


Hypertension


Hyperlipidemia





Chief Complaint/HPI


Chief Complaint/HPI


58 years old lady with history of hypertension and hyperlipidemia, strong 

family history of heart disease, admitted with acute chest pain, EKG changes, 

stress test was borderline with significant left ventricular systolic 

dysfunction ejection fraction calculated to be 32 percent, echocardiogram 

estimated ejection fraction 35-40 percent.  I proceeded with cardiac 

catheterization which showed nonischemic cardiomyopathy, TSH was normal, Tick 

panels were sent out, she was started on beta blockers and ARB, tolerating 

medication well.  Anxious to go home.  Had a long discussion about heart failure

, causes and management.





Discharge Summary


Hospital Course


Hospital Course


Chest pain nonspecific etiology, resembling angina, responsive to sublingual 

nitroglycerin, cardiac enzymes are negative, stress test did not show 

significant ischemia, there was mild decrease uptake at the apical segment of 

the anterolateral and inferolateral wall, ejection fraction was 32 percent on 

the SPECT images, on echocardiogram it was 35-40 percent, patient cardiac 

catheterization was carried out on July 2, 2018 showing nonischemic 

cardiomyopathy





Hypertension, I stopped Norvasc and started on Coreg and losartan and evaluate 

tolerance





Hyperlipidemia, restart Lipitor





Abnormal EKG with left ventricular hypertrophy pattern, incomplete bundle 

branch block





Strong family history of heart disease, father had myocardial infarction at age 

50


Labs


Laboratory Tests


7/1/18 22:35: 


White Blood Count 13.9H, Mean Platelet Volume 11.4H, Neutrophils (%) (Auto) 81H

, Lymphocytes (%) (Auto) 10L, Neutrophils # (Auto) 11.2H, Monocytes # (Auto) 

1.2H, Chloride Level 109H, Carbon Dioxide Level 20L, Glucose Level 115H, 

Magnesium Level 2.5H, Alanine Aminotransferase (ALT/SGPT) 90H, Albumin 4.7H


7/1/18 23:25: 


Urine Specific Gravity 1.015L, Urine Leukocyte Esterase 3+H, Urine RBC (Auto) 3+

H, Urine WBC 5-10H, Urine Squamous Epithelial Cells 10-25H, Urine Mucus SMALLH, 

Urine Benzodiazepines Screen POSITIVEH, Urine Cannabinoids Screen POSITIVEH


7/2/18 05:10: 


Mean Platelet Volume 11.2H, Chloride Level 110H, Carbon Dioxide Level 20L, Red 

Blood Count 4.07L


7/3/18 06:16: 


Mean Platelet Volume 10.9H, Chloride Level 111H, Carbon Dioxide Level 18L, 

Glucose Level 154H, Red Blood Count 4.33L





Procedures


None.





Discharge Physical Examination


Allergies:  


Coded Allergies:  


     No Known Drug Allergies (Unverified , 9/20/17)


Vitals & I&Os





Vital Signs








  Date Time  Temp Pulse Resp B/P (MAP) Pulse Ox O2 Delivery O2 Flow Rate FiO2


 


7/3/18 08:00 98.8 108 20 132/96 (108) 96 Room Air  








General Appearance:  Alert, Oriented X3, Cooperative, No Acute Distress


HEENT:  Atraumatic, PERRLA


Respiratory:  Clear to Auscultation, Normal Air Movement


Cardiovascular:  Regular Rate, Normal S1, Normal S2, No Murmurs


Abdominal:  Normal Bowel Sounds, Soft, No Tenderness, No Hepatosplenomegaly, No 

Masses


Extremities:  No Clubbing, No Cyanosis, No Edema, Normal Pulses, No Tenderness/

Swelling


Skin:  No Rashes, No Breakdown, No Significant Lesion


Neuro:  Normal Gait, Normal Speech, Strength at 5/5 X4 Ext, Normal Tone, 

Sensation Intact, Cranial Nerves 3-12 NL, Reflexes 2+


Psych/Mental Status:  Mental Status NL, Mood NL





Discharge


Home Medications


Reviewed and agree with Discharge Medication list on patient's Discharge 

Instruction sheet


Instructions to Patient/Family


Please see electronic discharge instructions given to patient.





Clinical Quality Measures


DVT/VTE Risk/Contraindication:


RFS Level Per Nursing on Admit:  0=No Risk/No VTE PPX











SANNA HICKMAN MD Jul 3, 2018 10:00